# Patient Record
Sex: MALE | Race: BLACK OR AFRICAN AMERICAN
[De-identification: names, ages, dates, MRNs, and addresses within clinical notes are randomized per-mention and may not be internally consistent; named-entity substitution may affect disease eponyms.]

---

## 2017-11-21 ENCOUNTER — HOSPITAL ENCOUNTER (EMERGENCY)
Dept: HOSPITAL 8 - ED | Age: 42
End: 2017-11-21
Payer: MEDICARE

## 2017-11-21 VITALS — BODY MASS INDEX: 31.45 KG/M2 | WEIGHT: 177.47 LBS | HEIGHT: 63 IN

## 2017-11-21 VITALS — DIASTOLIC BLOOD PRESSURE: 57 MMHG | SYSTOLIC BLOOD PRESSURE: 93 MMHG

## 2017-11-21 DIAGNOSIS — J44.9: ICD-10-CM

## 2017-11-21 DIAGNOSIS — G89.29: Primary | ICD-10-CM

## 2017-11-21 DIAGNOSIS — R10.9: ICD-10-CM

## 2017-11-21 DIAGNOSIS — F17.200: ICD-10-CM

## 2017-11-21 DIAGNOSIS — F20.9: ICD-10-CM

## 2017-11-21 LAB
AST SERPL-CCNC: 20 U/L (ref 15–37)
BUN SERPL-MCNC: 16 MG/DL (ref 7–18)
HCT VFR BLD CALC: 48.8 % (ref 39.2–51.8)
HGB BLD-MCNC: 16.7 G/DL (ref 13.7–18)
WBC # BLD AUTO: 11.8 X10^3/UL (ref 3.4–10)

## 2017-11-21 PROCEDURE — 85025 COMPLETE CBC W/AUTO DIFF WBC: CPT

## 2017-11-21 PROCEDURE — 74020: CPT

## 2017-11-21 PROCEDURE — 81003 URINALYSIS AUTO W/O SCOPE: CPT

## 2017-11-21 PROCEDURE — 36415 COLL VENOUS BLD VENIPUNCTURE: CPT

## 2017-11-21 PROCEDURE — 80053 COMPREHEN METABOLIC PANEL: CPT

## 2017-11-21 PROCEDURE — 99285 EMERGENCY DEPT VISIT HI MDM: CPT

## 2017-11-21 PROCEDURE — 83690 ASSAY OF LIPASE: CPT

## 2019-11-25 ENCOUNTER — HOSPITAL ENCOUNTER (EMERGENCY)
Dept: HOSPITAL 8 - ED | Age: 44
Discharge: HOME | End: 2019-11-25
Payer: MEDICARE

## 2019-11-25 VITALS — DIASTOLIC BLOOD PRESSURE: 64 MMHG | SYSTOLIC BLOOD PRESSURE: 118 MMHG

## 2019-11-25 VITALS — BODY MASS INDEX: 27.89 KG/M2 | WEIGHT: 157.41 LBS | HEIGHT: 63 IN

## 2019-11-25 DIAGNOSIS — F41.1: Primary | ICD-10-CM

## 2019-11-25 DIAGNOSIS — Z91.14: ICD-10-CM

## 2019-11-25 DIAGNOSIS — Z88.0: ICD-10-CM

## 2019-11-25 LAB
ALBUMIN SERPL-MCNC: 3.5 G/DL (ref 3.4–5)
ALP SERPL-CCNC: 139 U/L (ref 45–117)
ALT SERPL-CCNC: 26 U/L (ref 12–78)
ANION GAP SERPL CALC-SCNC: 2 MMOL/L (ref 5–15)
BASOPHILS # BLD AUTO: 0.02 X10^3/UL (ref 0–0.1)
BASOPHILS NFR BLD AUTO: 0 % (ref 0–1)
BILIRUB SERPL-MCNC: 0.4 MG/DL (ref 0.2–1)
CALCIUM SERPL-MCNC: 9.5 MG/DL (ref 8.5–10.1)
CHLORIDE SERPL-SCNC: 104 MMOL/L (ref 98–107)
CREAT SERPL-MCNC: 1.06 MG/DL (ref 0.7–1.3)
EOSINOPHIL # BLD AUTO: 0.26 X10^3/UL (ref 0–0.4)
EOSINOPHIL NFR BLD AUTO: 3 % (ref 1–7)
ERYTHROCYTE [DISTWIDTH] IN BLOOD BY AUTOMATED COUNT: 15 % (ref 9.4–14.8)
LYMPHOCYTES # BLD AUTO: 3.17 X10^3/UL (ref 1–3.4)
LYMPHOCYTES NFR BLD AUTO: 31 % (ref 22–44)
MCH RBC QN AUTO: 29.8 PG (ref 27.5–34.5)
MCHC RBC AUTO-ENTMCNC: 32.3 G/DL (ref 33.2–36.2)
MCV RBC AUTO: 92.1 FL (ref 81–97)
MD: (no result)
MONOCYTES # BLD AUTO: 1 X10^3/UL (ref 0.2–0.8)
MONOCYTES NFR BLD AUTO: 10 % (ref 2–9)
NEUTROPHILS # BLD AUTO: 5.68 X10^3/UL (ref 1.8–6.8)
NEUTROPHILS NFR BLD AUTO: 56 % (ref 42–75)
PLATELET # BLD AUTO: 372 X10^3/UL (ref 130–400)
PMV BLD AUTO: 6.6 FL (ref 7.4–10.4)
PROT SERPL-MCNC: 7.3 G/DL (ref 6.4–8.2)
RBC # BLD AUTO: 4.77 X10^6/UL (ref 4.38–5.82)
TROPONIN I SERPL-MCNC: < 0.015 NG/ML (ref 0–0.04)

## 2019-11-25 PROCEDURE — 93005 ELECTROCARDIOGRAM TRACING: CPT

## 2019-11-25 PROCEDURE — 85025 COMPLETE CBC W/AUTO DIFF WBC: CPT

## 2019-11-25 PROCEDURE — 36415 COLL VENOUS BLD VENIPUNCTURE: CPT

## 2019-11-25 PROCEDURE — 71046 X-RAY EXAM CHEST 2 VIEWS: CPT

## 2019-11-25 PROCEDURE — 80053 COMPREHEN METABOLIC PANEL: CPT

## 2019-11-25 PROCEDURE — 84484 ASSAY OF TROPONIN QUANT: CPT

## 2019-11-25 PROCEDURE — 99284 EMERGENCY DEPT VISIT MOD MDM: CPT

## 2019-11-25 NOTE — NUR
-------------------------------------------------------------------------------

          *** Note undone in ED - 11/25/19 at 0825 by DIANDRA ***           

-------------------------------------------------------------------------------

SLEEPING WHILE AWAITING RE-EVAL

## 2019-11-25 NOTE — NUR
RN to bedside, patient asking to be removed. Educated patient that he is an 
adult and can't be forced to do anything, but that the patient by not allowing 
his cardiac rhythms to be monitored or his oxygen to be monitored the patient 
can't be informed. Patient continues to refuse and removed his own pulsatile 
oxygen sensor. Patient reports worsening anxiety but appeared to perform 
behaviors which make him more anxious, patient has been pacing, on his phone 
the entire time. Provider aware.

## 2019-11-25 NOTE — NUR
PT WALKING UP AND DOWN HALLWAY. MEDICATED PER MAR FOR ANXIETY AND INFORMED THAT 
LABS HAVE BEEN ORDERED.

## 2019-11-25 NOTE — NUR
Patient into room, resting comfortably. Reported being anxious. Patient alert, 
oriented speech is clear. Physical assessment complete, all wnl. Awaiting 
assesment.

## 2019-11-25 NOTE — NUR
-------------------------------------------------------------------------------

          *** Note undone in [facility] - 11/25/19 at 0825 by DIANDRA ***           

-------------------------------------------------------------------------------

RE-EVAL BY MD AND DISCHARGE INSTRUCTIONS GIVEN